# Patient Record
Sex: MALE | URBAN - METROPOLITAN AREA
[De-identification: names, ages, dates, MRNs, and addresses within clinical notes are randomized per-mention and may not be internally consistent; named-entity substitution may affect disease eponyms.]

---

## 2017-07-28 ENCOUNTER — APPOINTMENT (RX ONLY)
Dept: URBAN - METROPOLITAN AREA CLINIC 158 | Facility: CLINIC | Age: 52
Setting detail: DERMATOLOGY
End: 2017-07-28

## 2017-07-28 DIAGNOSIS — L82.1 OTHER SEBORRHEIC KERATOSIS: ICD-10-CM

## 2017-07-28 DIAGNOSIS — L71.8 OTHER ROSACEA: ICD-10-CM

## 2017-07-28 DIAGNOSIS — D22 MELANOCYTIC NEVI: ICD-10-CM

## 2017-07-28 DIAGNOSIS — D18.0 HEMANGIOMA: ICD-10-CM

## 2017-07-28 PROBLEM — D23.72 OTHER BENIGN NEOPLASM OF SKIN OF LEFT LOWER LIMB, INCLUDING HIP: Status: ACTIVE | Noted: 2017-07-28

## 2017-07-28 PROBLEM — D22.5 MELANOCYTIC NEVI OF TRUNK: Status: ACTIVE | Noted: 2017-07-28

## 2017-07-28 PROBLEM — Z85.820 PERSONAL HISTORY OF MALIGNANT MELANOMA OF SKIN: Status: ACTIVE | Noted: 2017-07-28

## 2017-07-28 PROBLEM — D18.01 HEMANGIOMA OF SKIN AND SUBCUTANEOUS TISSUE: Status: ACTIVE | Noted: 2017-07-28

## 2017-07-28 PROBLEM — D22.62 MELANOCYTIC NEVI OF LEFT UPPER LIMB, INCLUDING SHOULDER: Status: ACTIVE | Noted: 2017-07-28

## 2017-07-28 PROCEDURE — ? TREATMENT REGIMEN

## 2017-07-28 PROCEDURE — 99203 OFFICE O/P NEW LOW 30 MIN: CPT

## 2017-07-28 PROCEDURE — ? COUNSELING

## 2017-07-28 ASSESSMENT — LOCATION DETAILED DESCRIPTION DERM
LOCATION DETAILED: RIGHT CENTRAL MALAR CHEEK
LOCATION DETAILED: LEFT SUPERIOR MEDIAL UPPER BACK
LOCATION DETAILED: RIGHT PROXIMAL POSTERIOR UPPER ARM
LOCATION DETAILED: RIGHT INFERIOR MEDIAL UPPER BACK
LOCATION DETAILED: LEFT CENTRAL MALAR CHEEK
LOCATION DETAILED: LEFT RADIAL DORSAL HAND
LOCATION DETAILED: LEFT PROXIMAL DORSAL FOREARM

## 2017-07-28 ASSESSMENT — LOCATION ZONE DERM
LOCATION ZONE: HAND
LOCATION ZONE: TRUNK
LOCATION ZONE: FACE
LOCATION ZONE: ARM

## 2017-07-28 ASSESSMENT — LOCATION SIMPLE DESCRIPTION DERM
LOCATION SIMPLE: LEFT FOREARM
LOCATION SIMPLE: LEFT CHEEK
LOCATION SIMPLE: LEFT UPPER BACK
LOCATION SIMPLE: RIGHT UPPER ARM
LOCATION SIMPLE: RIGHT UPPER BACK
LOCATION SIMPLE: LEFT HAND
LOCATION SIMPLE: RIGHT CHEEK

## 2017-07-28 NOTE — PROCEDURE: TREATMENT REGIMEN
Samples Given: soolantra
Plan: Per nahomy Carr to send in rx for soolantra if pt calls.
Detail Level: Zone

## 2020-09-10 VITALS
TEMPERATURE: 97.5 F | RESPIRATION RATE: 14 BRPM | DIASTOLIC BLOOD PRESSURE: 64 MMHG | DIASTOLIC BLOOD PRESSURE: 64 MMHG | SYSTOLIC BLOOD PRESSURE: 117 MMHG | SYSTOLIC BLOOD PRESSURE: 121 MMHG | DIASTOLIC BLOOD PRESSURE: 64 MMHG | TEMPERATURE: 98.9 F | RESPIRATION RATE: 21 BRPM | DIASTOLIC BLOOD PRESSURE: 62 MMHG | RESPIRATION RATE: 30 BRPM | DIASTOLIC BLOOD PRESSURE: 62 MMHG | DIASTOLIC BLOOD PRESSURE: 65 MMHG | TEMPERATURE: 98.9 F | HEART RATE: 65 BPM | SYSTOLIC BLOOD PRESSURE: 93 MMHG | RESPIRATION RATE: 14 BRPM | DIASTOLIC BLOOD PRESSURE: 72 MMHG | SYSTOLIC BLOOD PRESSURE: 117 MMHG | SYSTOLIC BLOOD PRESSURE: 93 MMHG | HEART RATE: 78 BPM | SYSTOLIC BLOOD PRESSURE: 96 MMHG | RESPIRATION RATE: 16 BRPM | RESPIRATION RATE: 16 BRPM | OXYGEN SATURATION: 98 % | WEIGHT: 270 LBS | TEMPERATURE: 97.5 F | TEMPERATURE: 97.5 F | SYSTOLIC BLOOD PRESSURE: 121 MMHG | HEART RATE: 69 BPM | DIASTOLIC BLOOD PRESSURE: 72 MMHG | RESPIRATION RATE: 21 BRPM | WEIGHT: 270 LBS | SYSTOLIC BLOOD PRESSURE: 93 MMHG | RESPIRATION RATE: 21 BRPM | SYSTOLIC BLOOD PRESSURE: 96 MMHG | HEART RATE: 65 BPM | TEMPERATURE: 98.9 F | HEART RATE: 69 BPM | WEIGHT: 270 LBS | RESPIRATION RATE: 18 BRPM | HEART RATE: 78 BPM | HEIGHT: 76 IN | HEART RATE: 71 BPM | SYSTOLIC BLOOD PRESSURE: 108 MMHG | OXYGEN SATURATION: 98 % | HEART RATE: 64 BPM | SYSTOLIC BLOOD PRESSURE: 117 MMHG | DIASTOLIC BLOOD PRESSURE: 62 MMHG | RESPIRATION RATE: 30 BRPM | HEART RATE: 69 BPM | RESPIRATION RATE: 30 BRPM | RESPIRATION RATE: 14 BRPM | DIASTOLIC BLOOD PRESSURE: 65 MMHG | HEIGHT: 76 IN | HEIGHT: 76 IN | SYSTOLIC BLOOD PRESSURE: 121 MMHG | SYSTOLIC BLOOD PRESSURE: 96 MMHG | OXYGEN SATURATION: 98 % | SYSTOLIC BLOOD PRESSURE: 108 MMHG | HEART RATE: 64 BPM | HEART RATE: 71 BPM | SYSTOLIC BLOOD PRESSURE: 108 MMHG | HEART RATE: 64 BPM | HEART RATE: 65 BPM | HEART RATE: 78 BPM | HEART RATE: 71 BPM | DIASTOLIC BLOOD PRESSURE: 65 MMHG | RESPIRATION RATE: 18 BRPM | DIASTOLIC BLOOD PRESSURE: 72 MMHG | RESPIRATION RATE: 18 BRPM | RESPIRATION RATE: 16 BRPM

## 2020-09-14 ENCOUNTER — AMBULATORY SURGICAL CENTER (OUTPATIENT)
Dept: URBAN - METROPOLITAN AREA SURGERY 3 | Facility: SURGERY | Age: 55
End: 2020-09-14
Payer: COMMERCIAL

## 2020-09-14 ENCOUNTER — OFFICE (OUTPATIENT)
Dept: URBAN - METROPOLITAN AREA PATHOLOGY 22 | Facility: PATHOLOGY | Age: 55
End: 2020-09-14
Payer: COMMERCIAL

## 2020-09-14 DIAGNOSIS — D12.0 BENIGN NEOPLASM OF CECUM: ICD-10-CM

## 2020-09-14 DIAGNOSIS — D12.3 BENIGN NEOPLASM OF TRANSVERSE COLON: ICD-10-CM

## 2020-09-14 DIAGNOSIS — Z12.11 ENCOUNTER FOR SCREENING FOR MALIGNANT NEOPLASM OF COLON: ICD-10-CM

## 2020-09-14 DIAGNOSIS — K64.0 FIRST DEGREE HEMORRHOIDS: ICD-10-CM

## 2020-09-14 DIAGNOSIS — D12.4 BENIGN NEOPLASM OF DESCENDING COLON: ICD-10-CM

## 2020-09-14 PROBLEM — K63.5 POLYP OF COLON: Status: ACTIVE | Noted: 2020-09-14

## 2020-09-14 PROCEDURE — 45381 COLONOSCOPY SUBMUCOUS NJX: CPT | Mod: 51 | Performed by: INTERNAL MEDICINE

## 2020-09-14 PROCEDURE — 88305 TISSUE EXAM BY PATHOLOGIST: CPT | Performed by: INTERNAL MEDICINE

## 2020-09-14 PROCEDURE — 45385 COLONOSCOPY W/LESION REMOVAL: CPT | Mod: 33 | Performed by: INTERNAL MEDICINE

## 2020-09-14 PROCEDURE — 45380 COLONOSCOPY AND BIOPSY: CPT | Mod: 59 | Performed by: INTERNAL MEDICINE

## 2023-10-16 ENCOUNTER — AMBULATORY SURGICAL CENTER (OUTPATIENT)
Dept: URBAN - METROPOLITAN AREA SURGERY 3 | Facility: SURGERY | Age: 58
End: 2023-10-16
Payer: COMMERCIAL

## 2023-10-16 ENCOUNTER — OFFICE (OUTPATIENT)
Dept: URBAN - METROPOLITAN AREA PATHOLOGY 12 | Facility: PATHOLOGY | Age: 58
End: 2023-10-16
Payer: COMMERCIAL

## 2023-10-16 VITALS
HEART RATE: 78 BPM | TEMPERATURE: 98.3 F | RESPIRATION RATE: 17 BRPM | SYSTOLIC BLOOD PRESSURE: 159 MMHG | HEART RATE: 97 BPM | SYSTOLIC BLOOD PRESSURE: 159 MMHG | DIASTOLIC BLOOD PRESSURE: 89 MMHG | OXYGEN SATURATION: 94 % | HEART RATE: 78 BPM | SYSTOLIC BLOOD PRESSURE: 155 MMHG | WEIGHT: 270 LBS | HEART RATE: 97 BPM | SYSTOLIC BLOOD PRESSURE: 131 MMHG | HEART RATE: 68 BPM | DIASTOLIC BLOOD PRESSURE: 89 MMHG | SYSTOLIC BLOOD PRESSURE: 139 MMHG | SYSTOLIC BLOOD PRESSURE: 127 MMHG | DIASTOLIC BLOOD PRESSURE: 76 MMHG | WEIGHT: 270 LBS | HEART RATE: 70 BPM | RESPIRATION RATE: 16 BRPM | RESPIRATION RATE: 17 BRPM | TEMPERATURE: 98.1 F | HEIGHT: 76 IN | HEART RATE: 68 BPM | TEMPERATURE: 98.3 F | HEART RATE: 72 BPM | HEART RATE: 72 BPM | SYSTOLIC BLOOD PRESSURE: 131 MMHG | HEART RATE: 68 BPM | DIASTOLIC BLOOD PRESSURE: 77 MMHG | WEIGHT: 270 LBS | HEART RATE: 97 BPM | OXYGEN SATURATION: 95 % | DIASTOLIC BLOOD PRESSURE: 84 MMHG | HEART RATE: 72 BPM | OXYGEN SATURATION: 94 % | SYSTOLIC BLOOD PRESSURE: 144 MMHG | SYSTOLIC BLOOD PRESSURE: 131 MMHG | TEMPERATURE: 98.1 F | SYSTOLIC BLOOD PRESSURE: 128 MMHG | OXYGEN SATURATION: 96 % | DIASTOLIC BLOOD PRESSURE: 84 MMHG | DIASTOLIC BLOOD PRESSURE: 87 MMHG | DIASTOLIC BLOOD PRESSURE: 87 MMHG | SYSTOLIC BLOOD PRESSURE: 139 MMHG | SYSTOLIC BLOOD PRESSURE: 155 MMHG | DIASTOLIC BLOOD PRESSURE: 76 MMHG | HEART RATE: 70 BPM | DIASTOLIC BLOOD PRESSURE: 77 MMHG | HEART RATE: 73 BPM | SYSTOLIC BLOOD PRESSURE: 144 MMHG | DIASTOLIC BLOOD PRESSURE: 77 MMHG | RESPIRATION RATE: 16 BRPM | OXYGEN SATURATION: 94 % | OXYGEN SATURATION: 95 % | HEART RATE: 73 BPM | DIASTOLIC BLOOD PRESSURE: 84 MMHG | SYSTOLIC BLOOD PRESSURE: 159 MMHG | HEART RATE: 73 BPM | SYSTOLIC BLOOD PRESSURE: 127 MMHG | SYSTOLIC BLOOD PRESSURE: 128 MMHG | SYSTOLIC BLOOD PRESSURE: 128 MMHG | RESPIRATION RATE: 16 BRPM | OXYGEN SATURATION: 96 % | DIASTOLIC BLOOD PRESSURE: 76 MMHG | SYSTOLIC BLOOD PRESSURE: 127 MMHG | OXYGEN SATURATION: 95 % | SYSTOLIC BLOOD PRESSURE: 155 MMHG | OXYGEN SATURATION: 96 % | RESPIRATION RATE: 17 BRPM | HEART RATE: 78 BPM | HEART RATE: 70 BPM | HEIGHT: 76 IN | TEMPERATURE: 98.3 F | HEIGHT: 76 IN | TEMPERATURE: 98.1 F | SYSTOLIC BLOOD PRESSURE: 144 MMHG | DIASTOLIC BLOOD PRESSURE: 87 MMHG | DIASTOLIC BLOOD PRESSURE: 89 MMHG | SYSTOLIC BLOOD PRESSURE: 139 MMHG

## 2023-10-16 DIAGNOSIS — K63.5 POLYP OF COLON: ICD-10-CM

## 2023-10-16 DIAGNOSIS — Z86.010 PERSONAL HISTORY OF COLONIC POLYPS: ICD-10-CM

## 2023-10-16 PROCEDURE — 45385 COLONOSCOPY W/LESION REMOVAL: CPT | Performed by: INTERNAL MEDICINE

## 2023-10-16 PROCEDURE — 45380 COLONOSCOPY AND BIOPSY: CPT | Mod: 59 | Performed by: INTERNAL MEDICINE

## 2023-10-16 PROCEDURE — 88305 TISSUE EXAM BY PATHOLOGIST: CPT

## 2023-10-19 LAB
GASTRO ONE PATHOLOGY: PDF REPORT: (no result)